# Patient Record
Sex: FEMALE | Race: WHITE | NOT HISPANIC OR LATINO | URBAN - METROPOLITAN AREA
[De-identification: names, ages, dates, MRNs, and addresses within clinical notes are randomized per-mention and may not be internally consistent; named-entity substitution may affect disease eponyms.]

---

## 2018-07-06 ENCOUNTER — TRANSFERRED RECORDS (OUTPATIENT)
Dept: HEALTH INFORMATION MANAGEMENT | Facility: CLINIC | Age: 33
End: 2018-07-06

## 2018-07-07 ENCOUNTER — NURSE TRIAGE (OUTPATIENT)
Dept: NURSING | Facility: CLINIC | Age: 33
End: 2018-07-07

## 2018-07-07 ENCOUNTER — HOSPITAL ENCOUNTER (EMERGENCY)
Facility: CLINIC | Age: 33
Discharge: HOME | End: 2018-07-07
Attending: EMERGENCY MEDICINE | Admitting: EMERGENCY MEDICINE
Payer: COMMERCIAL

## 2018-07-07 VITALS
RESPIRATION RATE: 16 BRPM | WEIGHT: 131.3 LBS | HEART RATE: 71 BPM | DIASTOLIC BLOOD PRESSURE: 82 MMHG | HEIGHT: 71 IN | TEMPERATURE: 97.8 F | SYSTOLIC BLOOD PRESSURE: 120 MMHG | OXYGEN SATURATION: 100 % | BODY MASS INDEX: 18.38 KG/M2

## 2018-07-07 DIAGNOSIS — O03.9 MISCARRIAGE: ICD-10-CM

## 2018-07-07 LAB
ABO + RH BLD: NORMAL
ABO + RH BLD: NORMAL
B-HCG SERPL-ACNC: 5 IU/L (ref 0–5)
BLD GP AB SCN SERPL QL: NORMAL
BLOOD BANK CMNT PATIENT-IMP: NORMAL
BLOOD BANK CMNT PATIENT-IMP: NORMAL
DATE RH IMM GL GVN: NORMAL
FETAL CELL SCN BLD QL ROSETTE: NORMAL
HGB BLD-MCNC: 15.3 G/DL (ref 11.7–15.7)
RH IG VIALS RECOM PATIENT: NORMAL

## 2018-07-07 PROCEDURE — 85018 HEMOGLOBIN: CPT | Performed by: EMERGENCY MEDICINE

## 2018-07-07 PROCEDURE — 96372 THER/PROPH/DIAG INJ SC/IM: CPT | Performed by: EMERGENCY MEDICINE

## 2018-07-07 PROCEDURE — 99284 EMERGENCY DEPT VISIT MOD MDM: CPT | Performed by: EMERGENCY MEDICINE

## 2018-07-07 PROCEDURE — 86901 BLOOD TYPING SEROLOGIC RH(D): CPT | Performed by: EMERGENCY MEDICINE

## 2018-07-07 PROCEDURE — 84702 CHORIONIC GONADOTROPIN TEST: CPT | Performed by: EMERGENCY MEDICINE

## 2018-07-07 PROCEDURE — 85461 HEMOGLOBIN FETAL: CPT | Performed by: EMERGENCY MEDICINE

## 2018-07-07 PROCEDURE — 86900 BLOOD TYPING SEROLOGIC ABO: CPT | Performed by: EMERGENCY MEDICINE

## 2018-07-07 PROCEDURE — 25000128 H RX IP 250 OP 636: Performed by: EMERGENCY MEDICINE

## 2018-07-07 PROCEDURE — 99284 EMERGENCY DEPT VISIT MOD MDM: CPT | Mod: Z6 | Performed by: EMERGENCY MEDICINE

## 2018-07-07 PROCEDURE — 86850 RBC ANTIBODY SCREEN: CPT | Performed by: EMERGENCY MEDICINE

## 2018-07-07 PROCEDURE — 25000132 ZZH RX MED GY IP 250 OP 250 PS 637: Performed by: EMERGENCY MEDICINE

## 2018-07-07 RX ORDER — ACETAMINOPHEN 500 MG
1000 TABLET ORAL ONCE
Status: COMPLETED | OUTPATIENT
Start: 2018-07-07 | End: 2018-07-07

## 2018-07-07 RX ORDER — PRENATAL VIT/IRON FUM/FOLIC AC 27MG-0.8MG
1 TABLET ORAL DAILY
COMMUNITY

## 2018-07-07 RX ADMIN — ACETAMINOPHEN 1000 MG: 500 TABLET, FILM COATED ORAL at 16:10

## 2018-07-07 RX ADMIN — HUMAN RHO(D) IMMUNE GLOBULIN 300 MCG: 300 INJECTION, SOLUTION INTRAMUSCULAR at 17:35

## 2018-07-07 ASSESSMENT — ENCOUNTER SYMPTOMS
ABDOMINAL PAIN: 1
FEVER: 0

## 2018-07-07 NOTE — ED NOTES
Labs drawn.  Rhogam ordered by MD. Discussed with pt that an ABO has to be run by our lab in order for blood bank to release rhogam.  Pt is requesting that rh not run due to insurance issues and duplicating of results.  She has a current type & screen in care everywhere with [facility] that she wants blood bank here to use.  Per our blood bank, pt needs a valid sample here at .  Pt is requesting that MD overrides blood bank's policy as she is an  representing the hospital.  MD Adler informed.

## 2018-07-07 NOTE — TELEPHONE ENCOUNTER
"Clinic Action Needed:No  Reason for Call: \"I may be going through a miscarriage and I'm seen at a [facility]\".  Caller reports that she was contacted late last night by [facility] that she has O negative blood and should be seen for pelvic exam and get a Rhogam shot within 72 hours.  Li called her insurance company and the [facility] ER is in Network, she is wondering if she could receive Rhogam at the  ER.  Advised that she can be seen and assessed at the [facility] and Rhogam should be able to be administered.  Advised to follow the advice from [facility] to be seen at ER today.  Gave address and location of [facility] ER to caller.  Caller appears to understand directives and agrees with plan.  Routed to: Not routed.    Rhonda Giles RN  [facility]        "

## 2018-07-07 NOTE — ED NOTES
Pt states she was seen at [facility] yesterday where she had a HCG and blood type and screen run. Pt is not part of care everywhere network but she was able to pull up her results on the computer and print them off to show documentation of type and screen. Pt and  verbalizing that they want to avoid duplicate labs and charges if possible since they just had them drawn yesterday. ER MD notified and results from  shared with ER MD. Copies of type and screen labeled and placed on pt's chart.

## 2018-07-07 NOTE — ED PROVIDER NOTES
"  History     Chief Complaint   Patient presents with     Threatened Miscarriage     HPI  Li Yoder is a 32 year old otherwise healthy female who presents for evaluation of vaginal bleeding and possible miscarriage. Patient reports she had a positive home pregnancy test on 07/04/18, three days ago, and a second positive test on 07/05/18, two days ago. On Thursday, two days ago she had the onset of very light brown vaginal spotting, however, yesterday had the onset of bright red vaginal bleeding \"like a normal period\" with associated mild abdominal cramping. She denies fever. Her abdominal discomfort has resolved today. She did have a type and screen as well as an HCG done at the [facility] outpatient laboratory yesterday which revealed the patient had an HCG of 10 and was blood type O negative.  Her last menstrual period was June 8, just about a month ago.  She states she checked a pregnancy test because she woke up and felt she should and her breasts were sore and tender.    Social: here with , no tobacco use    I have reviewed the Medications, Allergies, Past Medical and Surgical History, and Social History in the [employer] system.  History reviewed. No pertinent past medical history.    History reviewed. No pertinent surgical history.    No family history on file.    Social History   Substance Use Topics     Smoking status: Never Smoker     Smokeless tobacco: Never Used     Alcohol use No      Comment: social       Current Facility-Administered Medications   Medication     rho(D) immune globulin (HYPERRHO/RHOGAM) injection 300 mcg     Current Outpatient Prescriptions   Medication     Prenatal Vit-Fe Fumarate-FA (PRENATAL MULTIVITAMIN PLUS IRON) 27-0.8 MG TABS per tablet      No Known Allergies    Review of Systems   Constitutional: Negative for fever.   Gastrointestinal: Positive for abdominal pain (mild cramping, resolved).   Genitourinary: Positive for vaginal bleeding.   All other systems reviewed " "and are negative.      Physical Exam   BP: 135/81  Pulse: 71  Temp: 97.8  F (36.6  C)  Resp: 16  Height: 180.3 cm (5' 11\")  Weight: 59.6 kg (131 lb 4.8 oz)  SpO2: 100 %      Physical Exam  Physical Exam   Constitutional:   well nourished, well developed, resting comfortably   HENT:   Head: Normocephalic and atraumatic.   Cardiovascular: regular rate and rhythm without murmurs or gallops  Pulmonary/Chest: Clear to auscultation bilaterally, with no wheezes or retractions. No respiratory distress.  GI: Soft with good bowel sounds.  Non-tender, non-distended, with no guarding, no rebound, no peritoneal signs.   Back:  No bony or CVA tenderness   Musculoskeletal:  no edema or clubbing   Skin: Skin is warm and dry. No rash noted.   Neurological: alert and oriented to person, place, and time. Nonfocal exam  Psychiatric:  normal mood and affect.    ED Course     ED Course     Procedures       2:28 PM  The patient was seen and examined by Dr. Adler in Room 14.          Critical Care time:  none           Results for orders placed or performed during the hospital encounter of 07/07/18 (from the past 24 hour(s))   Rhogam Order   Result Value Ref Range    Rhogam Order Order received    HCG quantitative pregnancy (blood)   Result Value Ref Range    HCG Quantitative Serum 5 0 - 5 IU/L   Hemoglobin   Result Value Ref Range    Hemoglobin 15.3 11.7 - 15.7 g/dL   Rh Immune Globulin Study   Result Value Ref Range    ABO O     RH(D) Neg     Fetal Blood Screen Canceled, Test credited     Blood Bank Comment       Suitable for Rh Immunoglobulin  300 micrograms Rh Immune Globulin required      RhIg Administered PENDING     Amount of RHIG Required 300 micrograms Rh Immune Globulin required     Antibody Screen Neg       Labs Ordered and Resulted from Time of ED Arrival Up to the Time of Departure from the ED   HCG QUANTITATIVE PREGNANCY   HEMOGLOBIN   RHOGAM ORDER   RH IMMUNE GLOBULIN STUDY   ABO AND RH            Assessments & Plan (with " "Medical Decision Making)         I have reviewed the nursing notes.  Emergency Department course:  The patient was seen and examined at 1426 pm.   The patient printed out sheet from \"my chart\" that showed her blood type as 0 negative.  However, we do not have documentation in the [facility] system of the patient's blood type.  The patient was initially reluctant to repeat an Rh, as it has just been done at an outside facility.  However, blood bank policy is that we have been Rh type in our system prior to treating the patient with RhoGam.  This is hospital and blood bank policy.  I spoke with blood bank regarding this policy.  The patient agreed to this after some discussion.  I also spoke with OB/GYN, who recommended mini dose RhoGam as this is a miscarriage in very early pregnancy.  Laboratory studies show an hCG of 5 today.  Hemoglobin is normal at 15.3.  Rh is indeed O negative.  Patient received RhoGam prior to discharge; per blood bank, she required 300 micrograms of RhoGam    She was discharged with bleeding precautions.  I would like her to follow-up with her OB/GYN physician for reevaluation within a week to 2 weeks.  She should return for abdominal pain or other concerns.  I have reviewed the findings, diagnosis, plan and need for follow up with the patient.    New Prescriptions    No medications on file       Final diagnoses:   Miscarriage   I, Chitra Chairez, am serving as a trained medical scribe to document services personally performed by Hollie Adler MD, based on the provider's statements to me.   IHollie MD, was physically present and have reviewed and verified the accuracy of this note documented by Chitra Chairez.  This note was created in part by the use of Dragon voice recognition dictation system. Inadvertent grammatical errors and typographical errors may still exist.  Hollie Adler MD        7/7/2018   [facility], [place], EMERGENCY DEPARTMENT     Hollie Adler MD  07/07/18 " [id number]

## 2018-07-07 NOTE — ED AVS SNAPSHOT
[facility], [place], Emergency Department    [address] [place] [id number]    Phone:  [phone]                                       Li Yoder   MRN: [id number]    Department:  [facility], Emergency Department   Date of Visit:  7/7/2018           After Visit Summary Signature Page     I have received my discharge instructions, and my questions have been answered. I have discussed any challenges I see with this plan with the nurse or doctor.    ..........................................................................................................................................  Patient/Patient Representative Signature      ..........................................................................................................................................  Patient Representative Print Name and Relationship to Patient    ..................................................               ................................................  Date                                            Time    ..........................................................................................................................................  Reviewed by Signature/Title    ...................................................              ..............................................  Date                                                            Time

## 2018-07-07 NOTE — ED TRIAGE NOTES
"Triage Assessment & Note:    /81  Pulse 71  Temp 97.8  F (36.6  C) (Oral)  Resp 16  Ht 1.803 m (5' 11\")  Wt 59.6 kg (131 lb 4.8 oz)  LMP 06/08/2018  SpO2 100%  Breastfeeding? No  BMI 18.31 kg/m2      Patient presents with: Pt comes to triage with thought of miscarriage.  Pt reports that she started spotting on 7/5/18 and starting having dark red bleeding on 7/6/18.  No reports of fever, cough, or SOB.    Home Treatments/Remedies: Home medication    Febrile / Afebrile: afebrile    Duration of C/o: 48 hours    Kate Caputo RN  July 7, 2018        "

## 2018-07-07 NOTE — ED AVS SNAPSHOT
[facility], Emergency Department    [address] [address]    Phone:  [phone]                                       Li Yoder   MRN: [id number]    Department:  [facility], Emergency Department   Date of Visit:  7/7/2018           Patient Information     Date Of Birth          1985        Your diagnoses for this visit were:     Miscarriage        You were seen by Hollie Adler MD.        Discharge Instructions       Please make an appointment to follow up with OB/Gyn--[facility] (phone: [phone] or OB/[facility]yn--[facility] (phone: [phone]) in 7-10 days as needed.  Return for abdominal pain, heavy vaginal bleeding, or other concerns.    Discharge References/Attachments     MISCARRIAGE, DISCHARGE INSTRUCTIONS (ENGLISH)      24 Hour Appointment Hotline       To make an appointment at any [facility], call [phone] ([phone]). If you don't have a family doctor or clinic, we will help you find one. [place] clinics are conveniently located to serve the needs of you and your family.             Review of your medicines      Our records show that you are taking the medicines listed below. If these are incorrect, please call your family doctor or clinic.        Dose / Directions Last dose taken    prenatal multivitamin plus iron 27-0.8 MG Tabs per tablet   Dose:  1 tablet        Take 1 tablet by mouth daily   Refills:  0                Procedures and tests performed during your visit     ABO and Rh    HCG quantitative pregnancy (blood)    Hemoglobin    Rh Immune Globulin Study    Rhogam Order      Orders Needing Specimen Collection     None      Pending Results     Date and Time Order Name Status Description    7/7/2018 1530 Rh Immune Globulin Study In process             Pending Culture Results     No orders found from 7/5/2018 to 7/8/2018.            Pending Results Instructions     If you had any lab results that were not finalized at  "the time of your Discharge, you can call the ED Lab Result RN at [phone]. You will be contacted by this team for any positive Lab results or changes in treatment. The nurses are available 7 days a week from 10A to 6:30P.  You can leave a message 24 hours per day and they will return your call.        Thank you for choosing [facility]       Thank you for choosing [facility] for your care. Our goal is always to provide you with excellent care. Hearing back from our patients is one way we can continue to improve our services. Please take a few minutes to complete the written survey that you may receive in the mail after you visit with us. Thank you!        [email or web address] Information     [employer] lets you send messages to your doctor, view your test results, renew your prescriptions, schedule appointments and more. To sign up, go to [email or web address] . Click on \"Log in\" on the left side of the screen, which will take you to the Welcome page. Then click on \"Sign up Now\" on the right side of the page.     You will be asked to enter the access code listed below, as well as some personal information. Please follow the directions to create your username and password.     Your access code is: [email or web address]  Expires: 10/5/2018  5:29 PM     Your access code will  in 90 days. If you need help or a new code, please call your [facility] clinic or [phone].        Care EveryWhere ID     This is your Care EveryWhere ID. This could be used by other organizations to access your [facility] medical records  [id number]        Equal Access to Services     JODY BOO : Hadroverto griggs Sojosé miguel, waaxda luqadaha, qaybta kaalmada karine, candie lam . So [facility] [phone].    ATENCIÓN: Si habla español, tiene a devi disposición servicios gratuitos de asistencia lingüística. Llame al [phone].    We comply with applicable federal civil rights laws and [place] laws. We do not discriminate on the " basis of race, color, national origin, age, disability, sex, sexual orientation, or gender identity.            After Visit Summary       This is your record. Keep this with you and show to your community pharmacist(s) and doctor(s) at your next visit.

## 2018-07-07 NOTE — DISCHARGE INSTRUCTIONS
Please make an appointment to follow up with OB/Gyn--[facility] (phone: [phone] or [facility]--[facility] (phone: [phone]) in 7-10 days as needed.  Return for abdominal pain, heavy vaginal bleeding, or other concerns.